# Patient Record
Sex: MALE | Race: BLACK OR AFRICAN AMERICAN | HISPANIC OR LATINO | URBAN - METROPOLITAN AREA
[De-identification: names, ages, dates, MRNs, and addresses within clinical notes are randomized per-mention and may not be internally consistent; named-entity substitution may affect disease eponyms.]

---

## 2018-02-06 ENCOUNTER — OUTPATIENT (OUTPATIENT)
Dept: OUTPATIENT SERVICES | Facility: HOSPITAL | Age: 48
LOS: 1 days | End: 2018-02-06
Payer: COMMERCIAL

## 2018-02-06 VITALS
OXYGEN SATURATION: 98 % | RESPIRATION RATE: 18 BRPM | SYSTOLIC BLOOD PRESSURE: 133 MMHG | HEART RATE: 76 BPM | DIASTOLIC BLOOD PRESSURE: 84 MMHG | HEIGHT: 65 IN | WEIGHT: 190.04 LBS | TEMPERATURE: 99 F

## 2018-02-06 DIAGNOSIS — M65.9 SYNOVITIS AND TENOSYNOVITIS, UNSPECIFIED: ICD-10-CM

## 2018-02-06 DIAGNOSIS — Z01.818 ENCOUNTER FOR OTHER PREPROCEDURAL EXAMINATION: ICD-10-CM

## 2018-02-06 DIAGNOSIS — M94.261 CHONDROMALACIA, RIGHT KNEE: ICD-10-CM

## 2018-02-06 DIAGNOSIS — S69.90XA UNSPECIFIED INJURY OF UNSPECIFIED WRIST, HAND AND FINGER(S), INITIAL ENCOUNTER: Chronic | ICD-10-CM

## 2018-02-06 DIAGNOSIS — S83.249A OTHER TEAR OF MEDIAL MENISCUS, CURRENT INJURY, UNSPECIFIED KNEE, INITIAL ENCOUNTER: ICD-10-CM

## 2018-02-06 DIAGNOSIS — Z98.890 OTHER SPECIFIED POSTPROCEDURAL STATES: Chronic | ICD-10-CM

## 2018-02-06 DIAGNOSIS — S83.241A OTHER TEAR OF MEDIAL MENISCUS, CURRENT INJURY, RIGHT KNEE, INITIAL ENCOUNTER: ICD-10-CM

## 2018-02-06 LAB
ANION GAP SERPL CALC-SCNC: 8 MMOL/L — SIGNIFICANT CHANGE UP (ref 5–17)
BUN SERPL-MCNC: 24 MG/DL — HIGH (ref 7–18)
CALCIUM SERPL-MCNC: 9.5 MG/DL — SIGNIFICANT CHANGE UP (ref 8.4–10.5)
CHLORIDE SERPL-SCNC: 106 MMOL/L — SIGNIFICANT CHANGE UP (ref 96–108)
CO2 SERPL-SCNC: 26 MMOL/L — SIGNIFICANT CHANGE UP (ref 22–31)
CREAT SERPL-MCNC: 0.82 MG/DL — SIGNIFICANT CHANGE UP (ref 0.5–1.3)
GLUCOSE SERPL-MCNC: 90 MG/DL — SIGNIFICANT CHANGE UP (ref 70–99)
HCT VFR BLD CALC: 43.6 % — SIGNIFICANT CHANGE UP (ref 39–50)
HGB BLD-MCNC: 13.8 G/DL — SIGNIFICANT CHANGE UP (ref 13–17)
MCHC RBC-ENTMCNC: 28 PG — SIGNIFICANT CHANGE UP (ref 27–34)
MCHC RBC-ENTMCNC: 31.6 GM/DL — LOW (ref 32–36)
MCV RBC AUTO: 88.8 FL — SIGNIFICANT CHANGE UP (ref 80–100)
PLATELET # BLD AUTO: 278 K/UL — SIGNIFICANT CHANGE UP (ref 150–400)
POTASSIUM SERPL-MCNC: 3.7 MMOL/L — SIGNIFICANT CHANGE UP (ref 3.5–5.3)
POTASSIUM SERPL-SCNC: 3.7 MMOL/L — SIGNIFICANT CHANGE UP (ref 3.5–5.3)
RBC # BLD: 4.91 M/UL — SIGNIFICANT CHANGE UP (ref 4.2–5.8)
RBC # FLD: 12.2 % — SIGNIFICANT CHANGE UP (ref 10.3–14.5)
SODIUM SERPL-SCNC: 140 MMOL/L — SIGNIFICANT CHANGE UP (ref 135–145)
WBC # BLD: 4.4 K/UL — SIGNIFICANT CHANGE UP (ref 3.8–10.5)
WBC # FLD AUTO: 4.4 K/UL — SIGNIFICANT CHANGE UP (ref 3.8–10.5)

## 2018-02-06 PROCEDURE — G0463: CPT

## 2018-02-06 RX ORDER — SODIUM CHLORIDE 9 MG/ML
3 INJECTION INTRAMUSCULAR; INTRAVENOUS; SUBCUTANEOUS EVERY 8 HOURS
Qty: 0 | Refills: 0 | Status: DISCONTINUED | OUTPATIENT
Start: 2018-02-09 | End: 2018-02-17

## 2018-02-06 RX ORDER — ACETAMINOPHEN 500 MG
975 TABLET ORAL ONCE
Qty: 0 | Refills: 0 | Status: COMPLETED | OUTPATIENT
Start: 2018-02-09 | End: 2018-02-09

## 2018-02-06 NOTE — H&P PST ADULT - NEGATIVE GASTROINTESTINAL SYMPTOMS
no abdominal pain/no diarrhea/no change in bowel habits/no vomiting/no nausea/no flatulence/no constipation

## 2018-02-06 NOTE — H&P PST ADULT - ATTENDING COMMENTS
Pt with persistent and now increasing left knee pain with MRI c/w medial meniscal tear. Has tenderness along medical joint line with +Ciarra test.  For left knee arthroscopy. Discussed with pt about MRI findings, recommended arthroscopy, alternatives, and risks including medical, anesthesia, infection, blood clot, possible need for additional surgery. All questions answered and consent signed.

## 2018-02-06 NOTE — H&P PST ADULT - TELEPHONIC ID NUMBER OF THE INTERPRETER
[id number] got disconnected with Bee, called back ID# [id number] got disconnected with Bee, called back ID#[id number] Yonis galvin

## 2018-02-06 NOTE — H&P PST ADULT - ASSESSMENT
47 yr old male with no significant PMH presents with left knee tear meniscus. Pt for left knee arthroscopy on 2/9/2018. Pt is at low risk for procedure.

## 2018-02-06 NOTE — H&P PST ADULT - RS GEN PE MLT RESP DETAILS PC
normal/good air movement/no chest wall tenderness/no intercostal retractions/no subcutaneous emphysema/respirations non-labored/no wheezes/breath sounds equal/clear to auscultation bilaterally/no rales/no rhonchi/airway patent

## 2018-02-06 NOTE — H&P PST ADULT - NEGATIVE CARDIOVASCULAR SYMPTOMS
no chest pain/no dyspnea on exertion/no orthopnea/no palpitations/no claudication/no paroxysmal nocturnal dyspnea/no peripheral edema

## 2018-02-06 NOTE — H&P PST ADULT - PMH
Tear of medial meniscus of knee GERD (gastroesophageal reflux disease)    Tear of medial meniscus of knee

## 2018-02-06 NOTE — H&P PST ADULT - HISTORY OF PRESENT ILLNESS
47 yr old male with presents with c/o left knee pain due to torn meniscus sustained after twisting knee in 2016. Pt for knee arthroscopy on 2/9/2018. 47 yr old male with no significant PMH presents with c/o left knee pain due to torn meniscus sustained after twisting knee in 2016. Pt for left knee arthroscopy on 2/9/2018.

## 2018-02-06 NOTE — H&P PST ADULT - FAMILY HISTORY
Mother  Still living? Yes, Estimated age: Age Unknown  Family history of hypertension in mother, Age at diagnosis: Age Unknown  Family history of osteoarthritis, Age at diagnosis: Age Unknown

## 2018-02-06 NOTE — H&P PST ADULT - PSH
History of carpal tunnel release of both wrists Finger injury  h/o repair of 2 fingers  History of carpal tunnel release of both wrists

## 2018-02-06 NOTE — H&P PST ADULT - GASTROINTESTINAL DETAILS
soft/no guarding/no organomegaly/no bruit/no rebound tenderness/no rigidity/bowel sounds normal/normal/no masses palpable/nontender/no distention

## 2018-02-09 ENCOUNTER — OUTPATIENT (OUTPATIENT)
Dept: OUTPATIENT SERVICES | Facility: HOSPITAL | Age: 48
LOS: 1 days | End: 2018-02-09
Payer: COMMERCIAL

## 2018-02-09 VITALS
SYSTOLIC BLOOD PRESSURE: 119 MMHG | RESPIRATION RATE: 14 BRPM | TEMPERATURE: 98 F | DIASTOLIC BLOOD PRESSURE: 80 MMHG | OXYGEN SATURATION: 100 %

## 2018-02-09 VITALS
OXYGEN SATURATION: 97 % | HEART RATE: 64 BPM | HEIGHT: 65 IN | SYSTOLIC BLOOD PRESSURE: 124 MMHG | RESPIRATION RATE: 18 BRPM | WEIGHT: 190.04 LBS | TEMPERATURE: 98 F | DIASTOLIC BLOOD PRESSURE: 85 MMHG

## 2018-02-09 DIAGNOSIS — S83.241A OTHER TEAR OF MEDIAL MENISCUS, CURRENT INJURY, RIGHT KNEE, INITIAL ENCOUNTER: ICD-10-CM

## 2018-02-09 DIAGNOSIS — M94.261 CHONDROMALACIA, RIGHT KNEE: ICD-10-CM

## 2018-02-09 DIAGNOSIS — Z98.890 OTHER SPECIFIED POSTPROCEDURAL STATES: Chronic | ICD-10-CM

## 2018-02-09 DIAGNOSIS — S69.90XA UNSPECIFIED INJURY OF UNSPECIFIED WRIST, HAND AND FINGER(S), INITIAL ENCOUNTER: Chronic | ICD-10-CM

## 2018-02-09 DIAGNOSIS — M65.9 SYNOVITIS AND TENOSYNOVITIS, UNSPECIFIED: ICD-10-CM

## 2018-02-09 PROCEDURE — 88304 TISSUE EXAM BY PATHOLOGIST: CPT

## 2018-02-09 PROCEDURE — 88304 TISSUE EXAM BY PATHOLOGIST: CPT | Mod: 26

## 2018-02-09 PROCEDURE — 29881 ARTHRS KNE SRG MNISECTMY M/L: CPT | Mod: LT

## 2018-02-09 RX ORDER — OMEPRAZOLE 10 MG/1
1 CAPSULE, DELAYED RELEASE ORAL
Qty: 0 | Refills: 0 | COMMUNITY

## 2018-02-09 RX ORDER — OXYCODONE AND ACETAMINOPHEN 5; 325 MG/1; MG/1
2 TABLET ORAL EVERY 6 HOURS
Qty: 0 | Refills: 0 | Status: DISCONTINUED | OUTPATIENT
Start: 2018-02-09 | End: 2018-02-09

## 2018-02-09 RX ORDER — ONDANSETRON 8 MG/1
4 TABLET, FILM COATED ORAL ONCE
Qty: 0 | Refills: 0 | Status: DISCONTINUED | OUTPATIENT
Start: 2018-02-09 | End: 2018-02-09

## 2018-02-09 RX ORDER — FENTANYL CITRATE 50 UG/ML
25 INJECTION INTRAVENOUS
Qty: 0 | Refills: 0 | Status: DISCONTINUED | OUTPATIENT
Start: 2018-02-09 | End: 2018-02-09

## 2018-02-09 RX ORDER — CELECOXIB 200 MG/1
200 CAPSULE ORAL ONCE
Qty: 0 | Refills: 0 | Status: COMPLETED | OUTPATIENT
Start: 2018-02-09 | End: 2018-02-09

## 2018-02-09 RX ORDER — OXYCODONE AND ACETAMINOPHEN 5; 325 MG/1; MG/1
1 TABLET ORAL EVERY 4 HOURS
Qty: 0 | Refills: 0 | Status: DISCONTINUED | OUTPATIENT
Start: 2018-02-09 | End: 2018-02-09

## 2018-02-09 RX ORDER — ACETAMINOPHEN 500 MG
1000 TABLET ORAL ONCE
Qty: 0 | Refills: 0 | Status: COMPLETED | OUTPATIENT
Start: 2018-02-09 | End: 2018-02-09

## 2018-02-09 RX ADMIN — FENTANYL CITRATE 25 MICROGRAM(S): 50 INJECTION INTRAVENOUS at 09:39

## 2018-02-09 RX ADMIN — Medication 1000 MILLIGRAM(S): at 09:54

## 2018-02-09 RX ADMIN — CELECOXIB 200 MILLIGRAM(S): 200 CAPSULE ORAL at 06:51

## 2018-02-09 RX ADMIN — FENTANYL CITRATE 25 MICROGRAM(S): 50 INJECTION INTRAVENOUS at 09:54

## 2018-02-09 RX ADMIN — Medication 400 MILLIGRAM(S): at 09:39

## 2018-02-09 RX ADMIN — Medication 975 MILLIGRAM(S): at 06:50

## 2018-02-09 NOTE — ASU DISCHARGE PLAN (ADULT/PEDIATRIC). - MEDICATION SUMMARY - MEDICATIONS TO TAKE
I will START or STAY ON the medications listed below when I get home from the hospital:    oxyCODONE-acetaminophen 5 mg-325 mg oral tablet  -- 1 tab(s) by mouth every 4 hours, As needed, Moderate Pain (4 - 6) MDD:6  -- Indication: For Chondromalacia of right knee

## 2018-02-09 NOTE — ASU DISCHARGE PLAN (ADULT/PEDIATRIC). - NURSING INSTRUCTIONS
keep dressing dry, clean, intact, for 3 days. After 3 days, remove dressing and leave steri strips on. You can shower with steri strips on. If it fall off after shower is OK, if not you leave it there, it will fall off by itself in 2-3 days.

## 2018-02-09 NOTE — ASU DISCHARGE PLAN (ADULT/PEDIATRIC). - NOTIFY
Numbness, color, or temperature change to extremity Fever greater than 101/Numbness, tingling/Numbness, color, or temperature change to extremity/Bleeding that does not stop

## 2018-02-13 LAB — SURGICAL PATHOLOGY FINAL REPORT - CH: SIGNIFICANT CHANGE UP

## 2018-02-26 NOTE — BRIEF OPERATIVE NOTE - PROCEDURE
<<-----Click on this checkbox to enter Procedure Left knee arthroscopy  02/26/2018  Left knee arthroscopy, partial medial menisectomy, synovectomy, excision of medial plica, chondroplasty  Active  LAUREN

## 2018-02-26 NOTE — BRIEF OPERATIVE NOTE - POST-OP DX
Tear of medial meniscus of left knee, current, unspecified tear type, initial encounter  02/26/2018  Left knee medial meniscal tear, synovitis, medial plica, chondromalacia patella  Active  Devonte Medina

## 2018-02-26 NOTE — BRIEF OPERATIVE NOTE - PRE-OP DX
Tear of medial meniscus of left knee, current, unspecified tear type, subsequent encounter  02/26/2018  Left knee medial meniscal tear  Active  Devonte Medina

## 2019-01-25 NOTE — H&P PST ADULT - NS PRO TALK SOMEONE YN
[No Acute Distress] : no acute distress [Well Nourished] : well nourished [Well Developed] : well developed [Normal Sclera/Conjunctiva] : normal sclera/conjunctiva [PERRL] : pupils equal, round and reactive to light [Normal Outer Ear/Nose] : the ears and nose were normal in appearance [Normal Oropharynx] : the oropharynx was normal [No JVD] : no jugular venous distention [Supple] : the neck was supple [No LAD] : no lymphadenopathy [No Respiratory Distress] : no respiratory distress [Clear to Auscultation] : lungs were clear to auscultation bilaterally [No Accessory Muscle Use] : no accessory muscle use [Normal Rate] : heart rate was normal  [Regular Rhythm] : with a regular rhythm [Normal S1, S2] : normal S1 and S2 [No Murmurs] : no murmurs heard [No Edema] : there was no peripheral edema [Normal Bowel Sounds] : normal bowel sounds [Non Tender] : non-tender [Soft] : abdomen soft [Not Distended] : not distended [No Hernias] : no hernia was discovered [Normal Post Cervical Nodes] : no posterior cervical lymphadenopathy [Normal Anterior Cervical Nodes] : no anterior cervical lymphadenopathy [No Joint Swelling] : no joint swelling seen [No Clubbing, Cyanosis] : no clubbing  or cyanosis of the fingernails [No Rash] : no rash [No Skin Lesions] : no skin lesions [Kyphosis] : no kyphosis present [Scoliosis] : scoliosis not present [de-identified] : frail, non- verbal, cannot hold head up [de-identified] : unable to assess secondary to dementia [de-identified] : alert to name,  no

## 2021-10-02 NOTE — H&P PST ADULT - NSANTHOSAYNRD_GEN_A_CORE
Writer updated patient's daughter   No. LIBBY screening performed.  STOP BANG Legend: 0-2 = LOW Risk; 3-4 = INTERMEDIATE Risk; 5-8 = HIGH Risk

## 2022-03-08 NOTE — H&P PST ADULT - CORNEAL ABRASION RISK
56 y/o M w/ uncontrolled Type 2 DM complicated by CAD w/ hyperglycemia (high risk patient with severely uncontrolled diabetes with A1c of 11.2% at high risk of CAD and CVA with high level decision-making).  denies